# Patient Record
Sex: FEMALE | Race: OTHER | Employment: OTHER | URBAN - METROPOLITAN AREA
[De-identification: names, ages, dates, MRNs, and addresses within clinical notes are randomized per-mention and may not be internally consistent; named-entity substitution may affect disease eponyms.]

---

## 2017-01-05 NOTE — PATIENT DISCUSSION
CONJUNCTIVITIS (VIRAL), OS:  PRESCRIBE MAXITROL SUSPENSION QID OS X 1 WEEK, THEN DISCONTINUE. COOL COMPRESSES AND ARTIFICIAL TEARS FOR COMFORT AS NEEDED. RETURN FOR FOLLOW-UP AS SCHEDULED.

## 2017-01-05 NOTE — PATIENT DISCUSSION
Viral Conjunctivitis Counseling: The clinical exam and history are most compatible with a conjunctivitis that is likely viral in nature. I have explained to the patient that there is no treatment for this condition and recommended therapy is to limit symptoms. Therapy includes cool compresses and the use of artificial tears. The patient understands that this can often affect both eyes and is very contagious. The patient needs to be out of work until the symptoms have subsided. Patient was counseled on the importance of good hygiene in order to avoid the spread of the infection. Return for follow-up as scheduled.

## 2017-01-05 NOTE — PATIENT DISCUSSION
New Prescription: Maxitrol (neomycin-polymyxin-dexameth): drops,suspension: 3.5-10,000-0.1 mg/mL-unit/mL-% 1 drop four times a day as directed into left eye 01-

## 2017-03-15 NOTE — PATIENT DISCUSSION
SHRUTHI OU:  PRESCRIBE ARTIFICIAL TEARS BID - QID. DECREASE OUTDOOR EXPOSURE AND USE UV PROTECTION/ SUNGLASSES WITH OUTDOOR ACTIVITIES. RETURN FOR FOLLOW UP AS SCHEDULED.

## 2017-03-15 NOTE — PATIENT DISCUSSION
MILD DRY EYE, WITH CL WEAR OU: PRESCRIBED ARTIFICIAL TEARS BID - QID AND OMEGA-3 FISH OIL SUPPLEMENTS MAY HELP. REC REDUCE CL WEAR TIME AND REMOVE QHS OU. RETURN FOR FOLLOW-UP AS SCHEDULED OR SOONER IF SYMPTOMS WORSEN.

## 2017-03-15 NOTE — PATIENT DISCUSSION
03/15/14822 Day Acuvue Moist( 8.5)[id number] -&nbsp;SN &nbsp; J2 -&nbsp;SUZETTE callahan

## 2017-03-15 NOTE — PATIENT DISCUSSION
Pinguecula Counseling:  I have explained to the patient at length the diagnosis of pinguecula and its pathophysiology. I recommended the patient adequately protect their eyes from excessive UV light and dry, blake conditions. The use of artificial tears in dry conditions was encouraged. Return for follow-up as scheduled.

## 2017-09-27 NOTE — PATIENT DISCUSSION
Conjunctivitis Counseling:   I have explained the etiology to patient. Drops were prescribed to use and patient was encouraged to avoid rubbing their eyes. The patient understands that continued monitoring is important to make sure the symptoms resolve with current therapy and to check for changes that may suggest an alternative diagnosis. Return for follow-up as scheduled or sooner if symptoms worsen.

## 2017-09-27 NOTE — PATIENT DISCUSSION
New Prescription: Besivance (besifloxacin): drops,suspension: 0.6% 1 drop three times a day into left eye

## 2019-09-25 NOTE — PATIENT DISCUSSION
GLAUCOMA (S), OU:  OPTIC NERVE THINNING NOTICED ON ON OCT TODAY. POSITIVE FAMILY HISTORY. RETURN FOR FOLLOW UP AS SCHEDULED.

## 2021-05-21 NOTE — PATIENT DISCUSSION
REFRACTIVE ERROR OU: EDUCATED PATIENT ON FINDINGS. PRESCRIBE HABITUAL CL OD (-1.25), NO LENS OS FOR FLYING. CONTINUE HABITUAL -1.25 OD/-0.75 OS (OR -0.50 OS) FOR BEST DISTANCE VISION. RTC IF DISCOMFORT.

## 2022-06-10 ENCOUNTER — NEW PATIENT (OUTPATIENT)
Dept: URBAN - METROPOLITAN AREA CLINIC 4 | Facility: CLINIC | Age: 57
End: 2022-06-10

## 2022-06-10 DIAGNOSIS — E11.9: ICD-10-CM

## 2022-06-10 DIAGNOSIS — H02.834: ICD-10-CM

## 2022-06-10 DIAGNOSIS — H25.13: ICD-10-CM

## 2022-06-10 DIAGNOSIS — H02.831: ICD-10-CM

## 2022-06-10 PROCEDURE — 99204 OFFICE O/P NEW MOD 45 MIN: CPT

## 2022-06-10 PROCEDURE — 92015 DETERMINE REFRACTIVE STATE: CPT

## 2022-06-10 ASSESSMENT — KERATOMETRY
OD_K1POWER_DIOPTERS: 47.75
OS_K2POWER_DIOPTERS: 48.25
OS_AXISANGLE_DEGREES: 143
OD_AXISANGLE_DEGREES: 21
OS_K1POWER_DIOPTERS: 47.25
OD_AXISANGLE2_DEGREES: 111
OD_K2POWER_DIOPTERS: 48.25
OS_AXISANGLE2_DEGREES: 53

## 2022-06-10 ASSESSMENT — VISUAL ACUITY
OS_GLARE: 20/40
OD_CC: J1+
OS_CC: J1+
OD_PH: 20/25
OS_CC: 20/40-1
OS_PH: 20/30
OU_CC: 20/40
OD_CC: 20/40
OD_GLARE: 20/25

## 2022-06-10 ASSESSMENT — TONOMETRY
OS_IOP_MMHG: 13
OD_IOP_MMHG: 17